# Patient Record
Sex: FEMALE | Race: ASIAN | NOT HISPANIC OR LATINO | Employment: PART TIME | URBAN - METROPOLITAN AREA
[De-identification: names, ages, dates, MRNs, and addresses within clinical notes are randomized per-mention and may not be internally consistent; named-entity substitution may affect disease eponyms.]

---

## 2017-09-08 ENCOUNTER — OFFICE VISIT (OUTPATIENT)
Dept: FAMILY MEDICINE | Facility: CLINIC | Age: 47
End: 2017-09-08
Payer: COMMERCIAL

## 2017-09-08 VITALS
DIASTOLIC BLOOD PRESSURE: 61 MMHG | SYSTOLIC BLOOD PRESSURE: 99 MMHG | WEIGHT: 124 LBS | HEIGHT: 57 IN | BODY MASS INDEX: 26.75 KG/M2 | TEMPERATURE: 97.3 F | OXYGEN SATURATION: 98 % | HEART RATE: 71 BPM

## 2017-09-08 DIAGNOSIS — R79.89 LOW TSH LEVEL: ICD-10-CM

## 2017-09-08 DIAGNOSIS — R79.89 ELEVATED LFTS: ICD-10-CM

## 2017-09-08 DIAGNOSIS — D72.829 LEUKOCYTOSIS, UNSPECIFIED TYPE: Primary | ICD-10-CM

## 2017-09-08 DIAGNOSIS — Z23 ENCOUNTER FOR IMMUNIZATION: ICD-10-CM

## 2017-09-08 LAB
ALBUMIN SERPL-MCNC: 3.4 G/DL (ref 3.4–5)
ALP SERPL-CCNC: 67 U/L (ref 40–150)
ALT SERPL W P-5'-P-CCNC: 31 U/L (ref 0–50)
ANION GAP SERPL CALCULATED.3IONS-SCNC: 8 MMOL/L (ref 3–14)
AST SERPL W P-5'-P-CCNC: 17 U/L (ref 0–45)
BASOPHILS # BLD AUTO: 0.1 10E9/L (ref 0–0.2)
BASOPHILS NFR BLD AUTO: 0.6 %
BILIRUB SERPL-MCNC: 0.3 MG/DL (ref 0.2–1.3)
BUN SERPL-MCNC: 13 MG/DL (ref 7–30)
CALCIUM SERPL-MCNC: 8.5 MG/DL (ref 8.5–10.1)
CHLORIDE SERPL-SCNC: 107 MMOL/L (ref 94–109)
CO2 SERPL-SCNC: 26 MMOL/L (ref 20–32)
CREAT SERPL-MCNC: 0.9 MG/DL (ref 0.52–1.04)
DIFFERENTIAL METHOD BLD: NORMAL
EOSINOPHIL # BLD AUTO: 0.1 10E9/L (ref 0–0.7)
EOSINOPHIL NFR BLD AUTO: 1.6 %
ERYTHROCYTE [DISTWIDTH] IN BLOOD BY AUTOMATED COUNT: 12.3 % (ref 10–15)
GFR SERPL CREATININE-BSD FRML MDRD: 67 ML/MIN/1.7M2
GLUCOSE SERPL-MCNC: 97 MG/DL (ref 70–99)
HCT VFR BLD AUTO: 36.5 % (ref 35–47)
HGB BLD-MCNC: 12.1 G/DL (ref 11.7–15.7)
LYMPHOCYTES # BLD AUTO: 2.7 10E9/L (ref 0.8–5.3)
LYMPHOCYTES NFR BLD AUTO: 33.6 %
MCH RBC QN AUTO: 31.3 PG (ref 26.5–33)
MCHC RBC AUTO-ENTMCNC: 33.2 G/DL (ref 31.5–36.5)
MCV RBC AUTO: 94 FL (ref 78–100)
MONOCYTES # BLD AUTO: 0.7 10E9/L (ref 0–1.3)
MONOCYTES NFR BLD AUTO: 8.8 %
NEUTROPHILS # BLD AUTO: 4.5 10E9/L (ref 1.6–8.3)
NEUTROPHILS NFR BLD AUTO: 55.4 %
PLATELET # BLD AUTO: 205 10E9/L (ref 150–450)
POTASSIUM SERPL-SCNC: 3.6 MMOL/L (ref 3.4–5.3)
PROT SERPL-MCNC: 6.9 G/DL (ref 6.8–8.8)
RBC # BLD AUTO: 3.87 10E12/L (ref 3.8–5.2)
SODIUM SERPL-SCNC: 141 MMOL/L (ref 133–144)
TSH SERPL DL<=0.005 MIU/L-ACNC: 0.48 MU/L (ref 0.4–4)
WBC # BLD AUTO: 8.1 10E9/L (ref 4–11)

## 2017-09-08 PROCEDURE — 87340 HEPATITIS B SURFACE AG IA: CPT | Performed by: FAMILY MEDICINE

## 2017-09-08 PROCEDURE — 80050 GENERAL HEALTH PANEL: CPT | Performed by: FAMILY MEDICINE

## 2017-09-08 PROCEDURE — 86803 HEPATITIS C AB TEST: CPT | Performed by: FAMILY MEDICINE

## 2017-09-08 PROCEDURE — 36415 COLL VENOUS BLD VENIPUNCTURE: CPT | Performed by: FAMILY MEDICINE

## 2017-09-08 PROCEDURE — 86709 HEPATITIS A IGM ANTIBODY: CPT | Performed by: FAMILY MEDICINE

## 2017-09-08 PROCEDURE — 90471 IMMUNIZATION ADMIN: CPT | Performed by: FAMILY MEDICINE

## 2017-09-08 PROCEDURE — 90472 IMMUNIZATION ADMIN EACH ADD: CPT | Performed by: FAMILY MEDICINE

## 2017-09-08 PROCEDURE — 90686 IIV4 VACC NO PRSV 0.5 ML IM: CPT | Performed by: FAMILY MEDICINE

## 2017-09-08 PROCEDURE — 90715 TDAP VACCINE 7 YRS/> IM: CPT | Performed by: FAMILY MEDICINE

## 2017-09-08 PROCEDURE — 99214 OFFICE O/P EST MOD 30 MIN: CPT | Mod: 25 | Performed by: FAMILY MEDICINE

## 2017-09-08 ASSESSMENT — PAIN SCALES - GENERAL: PAINLEVEL: NO PAIN (0)

## 2017-09-08 NOTE — LETTER
September 12, 2017      Abbe Hines  [address] [facility] [place] [address]        Dear ,    We are writing to inform you of your test results.    Your kidney, liver, electrolyte, blood count and thyroid tests were normal for you.     Resulted Orders   Comprehensive metabolic panel (BMP + Alb, Alk Phos, ALT, AST, Total. Bili, TP)   Result Value Ref Range    Sodium 141 133 - 144 mmol/L    Potassium 3.6 3.4 - 5.3 mmol/L    Chloride 107 94 - 109 mmol/L    Carbon Dioxide 26 20 - 32 mmol/L    Anion Gap 8 3 - 14 mmol/L    Glucose 97 70 - 99 mg/dL    Urea Nitrogen 13 7 - 30 mg/dL    Creatinine 0.90 0.52 - 1.04 mg/dL    GFR Estimate 67 >60 mL/min/1.7m2      Comment:      Non  GFR Calc    GFR Estimate If Black 81 >60 mL/min/1.7m2      Comment:       GFR Calc    Calcium 8.5 8.5 - 10.1 mg/dL    Bilirubin Total 0.3 0.2 - 1.3 mg/dL    Albumin 3.4 3.4 - 5.0 g/dL    Protein Total 6.9 6.8 - 8.8 g/dL    Alkaline Phosphatase 67 40 - 150 U/L    ALT 31 0 - 50 U/L    AST 17 0 - 45 U/L   CBC with platelets differential   Result Value Ref Range    WBC 8.1 4.0 - 11.0 10e9/L    RBC Count 3.87 3.8 - 5.2 10e12/L    Hemoglobin 12.1 11.7 - 15.7 g/dL    Hematocrit 36.5 35.0 - 47.0 %    MCV 94 78 - 100 fl    MCH 31.3 26.5 - 33.0 pg    MCHC 33.2 31.5 - 36.5 g/dL    RDW 12.3 10.0 - 15.0 %    Platelet Count 205 150 - 450 10e9/L    Diff Method Automated Method     % Neutrophils 55.4 %    % Lymphocytes 33.6 %    % Monocytes 8.8 %    % Eosinophils 1.6 %    % Basophils 0.6 %    Absolute Neutrophil 4.5 1.6 - 8.3 10e9/L    Absolute Lymphocytes 2.7 0.8 - 5.3 10e9/L    Absolute Monocytes 0.7 0.0 - 1.3 10e9/L    Absolute Eosinophils 0.1 0.0 - 0.7 10e9/L    Absolute Basophils 0.1 0.0 - 0.2 10e9/L   Hepatitis A antibody IgM   Result Value Ref Range    Hepatitis A IgM Betzaida Nonreactive NR^Nonreactive      Comment:      IgM anti-HAV not detected. Does not exclude the possibility of recent exposure   to or infection with  HAV.     Hepatitis B surface antigen   Result Value Ref Range    Hep B Surface Agn Nonreactive NR^Nonreactive   Hepatitis C antibody   Result Value Ref Range    Hepatitis C Antibody Nonreactive NR^Nonreactive      Comment:      Assay performance characteristics have not been established for newborns,   infants, and children     TSH with free T4 reflex   Result Value Ref Range    TSH 0.48 0.40 - 4.00 mU/L       If you have any questions or concerns, please call the clinic at the number listed above.       Sincerely,        Eder Kelsey MD

## 2017-09-08 NOTE — PROGRESS NOTES
"  SUBJECTIVE:   Abbe Hines is a 47 year old female who presents to clinic today for the following health issues:      New Patient/Transfer of Care  Patient would like to discuss checking blood count. Was in hospital in July and was told blood count was elevated and to establish care with primary to follow thru.    Patient asymptomatic. No fever, chills, no sob, cp. No rashes.    Problem list and histories reviewed & adjusted, as indicated.  Additional history: as documented    Patient Active Problem List   Diagnosis     CARDIOVASCULAR SCREENING; LDL GOAL LESS THAN 160     Past Surgical History:   Procedure Laterality Date     CHOLECYSTECTOMY  July 2012       Social History   Substance Use Topics     Smoking status: Never Smoker     Smokeless tobacco: Never Used     Alcohol use No     Family History   Problem Relation Age of Onset     CANCER No family hx of      DIABETES No family hx of      Hypertension No family hx of      CEREBROVASCULAR DISEASE No family hx of      Thyroid Disease No family hx of      Glaucoma No family hx of      Macular Degeneration No family hx of              Reviewed and updated as needed this visit by clinical staffTobacco       Reviewed and updated as needed this visit by Provider         ROS:  Constitutional, HEENT, cardiovascular, pulmonary, GI, , musculoskeletal, neuro, skin, endocrine and psych systems are negative, except as otherwise noted.      OBJECTIVE:   BP 99/61 (BP Location: Left arm, Patient Position: Chair, Cuff Size: Adult Regular)  Pulse 71  Temp 97.3  F (36.3  C) (Oral)  Ht 4' 9.25\" (1.454 m)  Wt 124 lb (56.2 kg)  SpO2 98%  BMI 26.6 kg/m2  Body mass index is 26.6 kg/(m^2).  GENERAL: healthy, alert and no distress  NECK: no adenopathy, no asymmetry, masses, or scars and thyroid normal to palpation  RESP: lungs clear to auscultation - no rales, rhonchi or wheezes  CV: regular rate and rhythm, normal S1 S2, no S3 or S4, no murmur, click or rub, no peripheral edema and " peripheral pulses strong  ABDOMEN: soft, nontender, no hepatosplenomegaly, no masses and bowel sounds normal  MS: no gross musculoskeletal defects noted, no edema    Diagnostic Test Results:  none     ASSESSMENT/PLAN:       1. Leukocytosis, unspecified type  Likely stress-induced to acute illness. Recheck today.  - CBC with platelets differential    2. Elevated LFTs  Recent CT scan showed no liver abnormalities. Recheck today with viral hepatitis studies.  - Comprehensive metabolic panel (BMP + Alb, Alk Phos, ALT, AST, Total. Bili, TP)  - Hepatitis A antibody IgM  - Hepatitis B surface antigen  - Hepatitis C antibody    3. Low TSH level  History of. Recheck.  - TSH with free T4 reflex    4. Encounter for immunization    - HC FLU VAC PRESRV FREE QUAD SPLIT VIR 3+YRS IM  - ADMIN 1st VACCINE    See Patient Instructions    Eder Kelsey MD, MD  [facility]

## 2017-09-08 NOTE — MR AVS SNAPSHOT
After Visit Summary   9/8/2017    Abbe Hines    MRN: [id number]           Patient Information     Date Of Birth          1970        Visit Information        Provider Department      9/8/2017 2:15 PM Eder Kelsey MD; [employer] [facility] [facility]        Today's Diagnoses     Leukocytosis, unspecified type    -  1    Elevated LFTs        Encounter for immunization          Care Instructions    How to contact your care team providers:    [facility]  [phone]  Pharmacy [phone]    CHANO Gold Dr., Dr., PA-C, Dr., PA-C    Team RN: Fredo DUNN and Azeb BOLTON    Clinic hours  M-Th 7am-7pm   Fri 7am-5pm.   Urgent care M-F 11am-9pm,   Sat/sun 9am-5pm.  Pharmacy M-F 8:00am-8pm Sat/sun 9am-5pm.     All password changes, disabled accounts, or ID changes in [employer]/MyHealth will be done by our Access Services Department.   If you need help with your account or password, call: [phone]. Clinic staff no longer has the ability to change passwords.                         Follow-ups after your visit        Who to contact     If you have questions or need follow up information about today's clinic visit or your schedule please contact [facility] directly at [phone].  Normal or non-critical lab and imaging results will be communicated to you by MyChart, letter or phone within 4 business days after the clinic has received the results. If you do not hear from us within 7 days, please contact the clinic through [employer] or phone. If you have a critical or abnormal lab result, we will notify you by phone as soon as possible.  Submit refill requests through [employer] or call your pharmacy and they will forward the refill request to us. Please allow 3 business days for your refill to be completed.     "      Additional Information About Your Visit        MyChart Information     [employer] lets you send messages to your doctor, view your test results, renew your prescriptions, schedule appointments and more. To sign up, go to [email or web address] . Click on \"Log in\" on the left side of the screen, which will take you to the Welcome page. Then click on \"Sign up Now\" on the right side of the page.     You will be asked to enter the access code listed below, as well as some personal information. Please follow the directions to create your username and password.     Your access code is: [email or web address]  Expires: 2017  2:42 PM     Your access code will  in 90 days. If you need help or a new code, please call your [facility] clinic or [phone].        Care EveryWhere ID     This is your Care EveryWhere ID. This could be used by other organizations to access your [facility] medical records  [id number]        Your Vitals Were     Pulse Temperature Height Pulse Oximetry BMI (Body Mass Index)       71 97.3  F (36.3  C) (Oral) 4' 9.25\" (1.454 m) 98% 26.6 kg/m2        Blood Pressure from Last 3 Encounters:   17 99/61   10/20/15 108/67   10/06/15 131/71    Weight from Last 3 Encounters:   17 124 lb (56.2 kg)   10/20/15 122 lb 8 oz (55.6 kg)   10/06/15 123 lb (55.8 kg)              We Performed the Following     ADMIN 1st VACCINE     CBC with platelets differential     Comprehensive metabolic panel (BMP + Alb, Alk Phos, ALT, AST, Total. Bili, TP)     HC FLU VAC PRESRV FREE QUAD SPLIT VIR 3+YRS IM     Hepatitis A antibody IgM     Hepatitis B surface antigen     Hepatitis C antibody        Primary Care Provider    Clinic Unassigned Karel Norman MD       No address on file        Equal Access to Services     [facility] : Hank Hickman, wamaryda lupankajadaha, qaybta kaalmada tono, david rascon. So [facility] [phone].    ATENCIÓN: Si habla español, tiene a tong disposición " servicios gratuitos de asistencia lingüística. Cortes xie [phone].    We comply with applicable federal civil rights laws and [place] laws. We do not discriminate on the basis of race, color, national origin, age, disability sex, sexual orientation or gender identity.            Thank you!     Thank you for choosing [facility]  for your care. Our goal is always to provide you with excellent care. Hearing back from our patients is one way we can continue to improve our services. Please take a few minutes to complete the written survey that you may receive in the mail after your visit with us. Thank you!             Your Updated Medication List - Protect others around you: Learn how to safely use, store and throw away your medicines at www.disposemymeds.org.          This list is accurate as of: 9/8/17  2:42 PM.  Always use your most recent med list.                   Brand Name Dispense Instructions for use Diagnosis    diclofenac 75 MG EC tablet    VOLTAREN    60 tablet    Take 1 tablet (75 mg) by mouth 2 times daily as needed for moderate pain Take with food.    Neck pain, Muscle spasm

## 2017-09-08 NOTE — NURSING NOTE
"Chief Complaint   Patient presents with     Establish Care     was told  had elevated blood count at hospital in july, was told to establish care with primary to follow thru with blood work       Initial BP 99/61 (BP Location: Left arm, Patient Position: Chair, Cuff Size: Adult Regular)  Pulse 71  Temp 97.3  F (36.3  C) (Oral)  Ht 4' 9.25\" (1.454 m)  Wt 124 lb (56.2 kg)  SpO2 98%  BMI 26.6 kg/m2 Estimated body mass index is 26.6 kg/(m^2) as calculated from the following:    Height as of this encounter: 4' 9.25\" (1.454 m).    Weight as of this encounter: 124 lb (56.2 kg).  Medication Reconciliation: complete     Heather La MA      "

## 2017-09-08 NOTE — NURSING NOTE
Injectable Influenza Immunization Documentation      1.  Has the patient received the information for the injectable influenza vaccine? YES    2. Is the patient 6 months of age or older? YES    3. Does the patient have any of the following contraindications?          Severe allergy to eggs? No     Severe allergic reaction to previous influenza vaccines? No     Allergy to contact lens solution/thimerosol? No     History of Guillain-Muir syndrome? No     Undergoing chemotherapy or radiation therapy?       (vaccine should be given at least 2 weeks prior or 3 weeks after)  No     Currently have moderate or severe illness? No         4.  The vaccine has been administered and the patient was instructed to wait 15 minutes before leaving the building in the event of an allergic reaction: YES    Vaccination given by Heather La MA    Screening Questionnaire for Adult Immunization    Are you sick today?   No   Do you have allergies to medications, food, a vaccine component or latex?   No   Have you ever had a serious reaction after receiving a vaccination?   No   Do you have a long-term health problem with heart disease, lung disease, asthma, kidney disease, metabolic disease (e.g. diabetes), anemia, or other blood disorder?   No   Do you have cancer, leukemia, HIV/AIDS, or any other immune system problem?   No   In the past 3 months, have you taken medications that affect  your immune system, such as prednisone, other steroids, or anticancer drugs; drugs for the treatment of rheumatoid arthritis, Crohn s disease, or psoriasis; or have you had radiation treatments?   No   Have you had a seizure, or a brain or other nervous system problem?   No   During the past year, have you received a transfusion of blood or blood     products, or been given immune (gamma) globulin or antiviral drug?   No   For women: Are you pregnant or is there a chance you could become        pregnant during the next month?   No   Have you received any  vaccinations in the past 4 weeks?   No     Immunization questionnaire answers were all negative.        Per orders of Dr. Kelsey, injection of tdap given by Heathre La. Patient instructed to remain in clinic for 15 minutes afterwards, and to report any adverse reaction to me immediately.       Screening performed by Heather La on 9/8/2017 at 2:59 PM.

## 2017-09-08 NOTE — PATIENT INSTRUCTIONS
How to contact your care team providers:    [facility]  [phone]  Pharmacy [phone]    CHANO Gold Dr., Dr., PA-C, Dr., PA-C    Team RN: Fredo BOLTON    Clinic hours  M-Th 7am-7pm   Fri 7am-5pm.   Urgent care M-F 11am-9pm,   Sat/sun 9am-5pm.  Pharmacy M-F 8:00am-8pm Sat/sun 9am-5pm.     All password changes, disabled accounts, or ID changes in [employer]/MyHealth will be done by our Access Services Department.   If you need help with your account or password, call: [phone]. Clinic staff no longer has the ability to change passwords.

## 2017-09-11 LAB
HAV IGM SERPL QL IA: NONREACTIVE
HBV SURFACE AG SERPL QL IA: NONREACTIVE
HCV AB SERPL QL IA: NONREACTIVE

## 2017-09-12 ENCOUNTER — ALLIED HEALTH/NURSE VISIT (OUTPATIENT)
Dept: NURSING | Facility: CLINIC | Age: 47
End: 2017-09-12
Payer: COMMERCIAL

## 2017-09-12 DIAGNOSIS — Z11.1 SCREENING EXAMINATION FOR PULMONARY TUBERCULOSIS: Primary | ICD-10-CM

## 2017-09-12 PROCEDURE — 86580 TB INTRADERMAL TEST: CPT

## 2017-09-12 PROCEDURE — 99207 ZZC NO CHARGE NURSE ONLY: CPT

## 2017-09-12 NOTE — MR AVS SNAPSHOT
"              After Visit Summary   9/12/2017    Abbe Hines    MRN: [id number]           Patient Information     Date Of Birth          1970        Visit Information        Provider Department      9/12/2017 3:30 PM [employer] [employer]; [facility]        Today's Diagnoses     Screening examination for pulmonary tuberculosis    -  1       Follow-ups after your visit        Your next 10 appointments already scheduled     Sep 14, 2017  3:15 PM CDT   Nurse Only with DONTRELL RN   [facility]    [address] [place] [id number] [phone]              Who to contact     If you have questions or need follow up information about today's clinic visit or your schedule please contact [facility] directly at [phone].  Normal or non-critical lab and imaging results will be communicated to you by [employer], letter or phone within 4 business days after the clinic has received the results. If you do not hear from us within 7 days, please contact the clinic through MyChart or phone. If you have a critical or abnormal lab result, we will notify you by phone as soon as possible.  Submit refill requests through [employer] or call your pharmacy and they will forward the refill request to us. Please allow 3 business days for your refill to be completed.          Additional Information About Your Visit        MyChart Information     [employer] lets you send messages to your doctor, view your test results, renew your prescriptions, schedule appointments and more. To sign up, go to [email or web address] . Click on \"Log in\" on the left side of the screen, which will take you to the Welcome page. Then click on \"Sign up Now\" on the right side of the page.     You will be asked to enter the access code listed below, as well as some personal information. Please follow the directions to create your username and " password.     Your access code is: [email or web address]  Expires: 2017  2:42 PM     Your access code will  in 90 days. If you need help or a new code, please call your [place] clinic or [phone].        Care EveryWhere ID     This is your Care EveryWhere ID. This could be used by other organizations to access your [place] medical records  [id number]         Blood Pressure from Last 3 Encounters:   17 99/61   10/20/15 108/67   10/06/15 131/71    Weight from Last 3 Encounters:   17 124 lb (56.2 kg)   10/20/15 122 lb 8 oz (55.6 kg)   10/06/15 123 lb (55.8 kg)              We Performed the Following     ADMIN 1st VACCINE     TB INTRADERMAL TEST        Primary Care Provider    Clinic Unassigned Karel Norman MD       No address on file        Equal Access to Services     [facility]: Hadii acosta ku hadasho Soomaali, waaxda luqadaha, qaybta kaalmada adeegyada, waxay antelmoin norman salinas . So [facility] [phone].    ATENCIÓN: Si habla español, tiene a tong disposición servicios gratuitos de asistencia lingüística. Llame al [phone].    We comply with applicable federal civil rights laws and [place] laws. We do not discriminate on the basis of race, color, national origin, age, disability sex, sexual orientation or gender identity.            Thank you!     Thank you for choosing [facility]  for your care. Our goal is always to provide you with excellent care. Hearing back from our patients is one way we can continue to improve our services. Please take a few minutes to complete the written survey that you may receive in the mail after your visit with us. Thank you!             Your Updated Medication List - Protect others around you: Learn how to safely use, store and throw away your medicines at www.disposemymeds.org.          This list is accurate as of: 17  3:39 PM.  Always use your most recent med list.                   Brand Name Dispense Instructions for use  Diagnosis    diclofenac 75 MG EC tablet    VOLTAREN    60 tablet    Take 1 tablet (75 mg) by mouth 2 times daily as needed for moderate pain Take with food.    Neck pain, Muscle spasm

## 2017-09-12 NOTE — NURSING NOTE
The patient is asked the following questions today and these are her answers:    -Have you had a mantoux administered in the past 30 days?    No  -Have you had a previous positive Mantoux.  No  -Have you received BCG in the past.  No  -Have you had a live vaccine  (MMR, Varicella, OPV, Yellow Fever) in the last 6 weeks.  No  -Have you had and active  viral or bacterial infection in the past 6 weeks.  No  -Have you received corticosteroids or immunosuppressive agents in the past 6 weeks.  No  -Have you been diagnosed with HIV?  No  -Do you have a maglinancy?  No   IDALMIS Juares MA

## 2017-09-14 ENCOUNTER — ALLIED HEALTH/NURSE VISIT (OUTPATIENT)
Dept: NURSING | Facility: CLINIC | Age: 47
End: 2017-09-14

## 2017-09-14 DIAGNOSIS — Z11.1 SCREENING EXAMINATION FOR PULMONARY TUBERCULOSIS: Primary | ICD-10-CM

## 2017-09-14 LAB
PPDINDURATION: 0 MM (ref 0–5)
PPDREDNESS: 0 MM

## 2017-09-14 NOTE — PROGRESS NOTES
Mantoux results NEGATIVE.   No induration.  No swelling.  No redness.    Copy of results given to patient.     Azeb Turner RN

## 2017-09-14 NOTE — MR AVS SNAPSHOT
"              After Visit Summary   2017    Abbe Hines    MRN: [id number]           Patient Information     Date Of Birth          1970        Visit Information        Provider Department      2017 3:15 PM [employer]; [facility]        Today's Diagnoses     Screening examination for pulmonary tuberculosis    -  1       Follow-ups after your visit        Who to contact     If you have questions or need follow up information about today's clinic visit or your schedule please contact [facility] directly at [phone].  Normal or non-critical lab and imaging results will be communicated to you by [employer], letter or phone within 4 business days after the clinic has received the results. If you do not hear from us within 7 days, please contact the clinic through [employer] or phone. If you have a critical or abnormal lab result, we will notify you by phone as soon as possible.  Submit refill requests through [employer] or call your pharmacy and they will forward the refill request to us. Please allow 3 business days for your refill to be completed.          Additional Information About Your Visit        MyChart Information     [employer] lets you send messages to your doctor, view your test results, renew your prescriptions, schedule appointments and more. To sign up, go to [email or web address] . Click on \"Log in\" on the left side of the screen, which will take you to the Welcome page. Then click on \"Sign up Now\" on the right side of the page.     You will be asked to enter the access code listed below, as well as some personal information. Please follow the directions to create your username and password.     Your access code is: [email or web address]  Expires: 2017  2:42 PM     Your access code will  in 90 days. If you need help or a new code, please call your [facility] or [phone].        Care EveryWhere ID     This is your Care " EveryWhere ID. This could be used by other organizations to access your [facility] medical records  [id number]         Blood Pressure from Last 3 Encounters:   09/08/17 99/61   10/20/15 108/67   10/06/15 131/71    Weight from Last 3 Encounters:   09/08/17 124 lb (56.2 kg)   10/20/15 122 lb 8 oz (55.6 kg)   10/06/15 123 lb (55.8 kg)              Today, you had the following     No orders found for display       Primary Care Provider    Clinic Unassigned Karel Norman MD       No address on file        Equal Access to Services     [facility]: Hadii aad ku hadasho Soomaali, waaxda luqadaha, qaybta kaalmada adeegyalita, david salinas . So [facility] [phone].    ATENCIÓN: Si habla español, tiene a tong disposición servicios gratuitos de asistencia lingüística. Llame al [phone].    We comply with applicable federal civil rights laws and [place] laws. We do not discriminate on the basis of race, color, national origin, age, disability sex, sexual orientation or gender identity.            Thank you!     Thank you for choosing [facility]  for your care. Our goal is always to provide you with excellent care. Hearing back from our patients is one way we can continue to improve our services. Please take a few minutes to complete the written survey that you may receive in the mail after your visit with us. Thank you!             Your Updated Medication List - Protect others around you: Learn how to safely use, store and throw away your medicines at www.disposemymeds.org.          This list is accurate as of: 9/14/17  3:47 PM.  Always use your most recent med list.                   Brand Name Dispense Instructions for use Diagnosis    diclofenac 75 MG EC tablet    VOLTAREN    60 tablet    Take 1 tablet (75 mg) by mouth 2 times daily as needed for moderate pain Take with food.    Neck pain, Muscle spasm

## 2017-10-18 ENCOUNTER — OFFICE VISIT (OUTPATIENT)
Dept: FAMILY MEDICINE | Facility: CLINIC | Age: 47
End: 2017-10-18
Payer: COMMERCIAL

## 2017-10-18 VITALS
SYSTOLIC BLOOD PRESSURE: 122 MMHG | TEMPERATURE: 97.3 F | WEIGHT: 120 LBS | DIASTOLIC BLOOD PRESSURE: 84 MMHG | HEART RATE: 68 BPM | BODY MASS INDEX: 25.89 KG/M2 | HEIGHT: 57 IN

## 2017-10-18 DIAGNOSIS — M54.2 CERVICALGIA: Primary | ICD-10-CM

## 2017-10-18 PROCEDURE — 99214 OFFICE O/P EST MOD 30 MIN: CPT | Performed by: FAMILY MEDICINE

## 2017-10-18 RX ORDER — CYCLOBENZAPRINE HCL 10 MG
10 TABLET ORAL 3 TIMES DAILY PRN
Qty: 90 TABLET | Refills: 1 | Status: SHIPPED | OUTPATIENT
Start: 2017-10-18

## 2017-10-18 RX ORDER — NAPROXEN SODIUM 550 MG/1
550 TABLET ORAL 2 TIMES DAILY WITH MEALS
Qty: 60 TABLET | Refills: 3 | Status: SHIPPED | OUTPATIENT
Start: 2017-10-18

## 2017-10-18 NOTE — PATIENT INSTRUCTIONS
How to contact your care team providers:    [facility]  [phone]  Pharmacy [phone]    CHANO Gold Dr., Dr., PA-C, Dr., PA-C    Team RN: Fredo BOLTON    Clinic hours  M-Th 7am-7pm   Fri 7am-5pm.   Urgent care M-F 11am-9pm,   Sat/sun 9am-5pm.  Pharmacy M-F 8:00am-8pm Sat/sun 9am-5pm.     All password changes, disabled accounts, or ID changes in [employer]/MyHealth will be done by our Access Services Department.   If you need help with your account or password, call: [phone]. Clinic staff no longer has the ability to change passwords.

## 2017-10-18 NOTE — NURSING NOTE
"Chief Complaint   Patient presents with     Headache     rear ended last night, woke up with some headache       Initial /84 (BP Location: Left arm, Patient Position: Chair, Cuff Size: Adult Regular)  Pulse 68  Temp 97.3  F (36.3  C) (Oral)  Ht 4' 9.25\" (1.454 m)  Wt 120 lb (54.4 kg)  BMI 25.74 kg/m2 Estimated body mass index is 25.74 kg/(m^2) as calculated from the following:    Height as of this encounter: 4' 9.25\" (1.454 m).    Weight as of this encounter: 120 lb (54.4 kg).  Medication Reconciliation: complete     Heather La MA      "
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
## 2017-10-18 NOTE — MR AVS SNAPSHOT
After Visit Summary   10/18/2017    Abbe Hines    MRN: [id number]           Patient Information     Date Of Birth          1970        Visit Information        Provider Department      10/18/2017 2:00 PM Eder Kelsey MD; [employer] [facility]        Today's Diagnoses     Cervicalgia    -  1      Care Instructions    How to contact your care team providers:    [facility]  [phone]  Pharmacy [phone]    [address] Dr., PA-C, Dr., PA-C    Team RN: Fredo DUNN and Azeb BOLTON    Clinic hours  M-Th 7am-7pm   Fri 7am-5pm.   Urgent care M-F 11am-9pm,   Sat/sun 9am-5pm.  Pharmacy M-F 8:00am-8pm Sat/sun 9am-5pm.     All password changes, disabled accounts, or ID changes in [employer]MyHealth will be done by our Access Services Department.   If you need help with your account or password, call: [phone]. Clinic staff no longer has the ability to change passwords.                         Follow-ups after your visit        Who to contact     If you have questions or need follow up information about today's clinic visit or your schedule please contact [facility] directly at [phone].  Normal or non-critical lab and imaging results will be communicated to you by MyChart, letter or phone within 4 business days after the clinic has received the results. If you do not hear from us within 7 days, please contact the clinic through [employer] or phone. If you have a critical or abnormal lab result, we will notify you by phone as soon as possible.  Submit refill requests through [employer] or call your pharmacy and they will forward the refill request to us. Please allow 3 business days for your refill to be completed.          Additional Information About Your Visit        [employer]  "Information     [employer] lets you send messages to your doctor, view your test results, renew your prescriptions, schedule appointments and more. To sign up, go to [email or web address] . Click on \"Log in\" on the left side of the screen, which will take you to the Welcome page. Then click on \"Sign up Now\" on the right side of the page.     You will be asked to enter the access code listed below, as well as some personal information. Please follow the directions to create your username and password.     Your access code is: [email or web address]  Expires: 2017  2:42 PM     Your access code will  in 90 days. If you need help or a new code, please call your [place] clinic or [phone].        Care EveryWhere ID     This is your Care EveryWhere ID. This could be used by other organizations to access your [place] medical records  [id number]        Your Vitals Were     Pulse Temperature Height BMI (Body Mass Index)          68 97.3  F (36.3  C) (Oral) 4' 9.25\" (1.454 m) 25.74 kg/m2         Blood Pressure from Last 3 Encounters:   10/18/17 122/84   17 99/61   10/20/15 108/67    Weight from Last 3 Encounters:   10/18/17 120 lb (54.4 kg)   17 124 lb (56.2 kg)   10/20/15 122 lb 8 oz (55.6 kg)              Today, you had the following     No orders found for display         Today's Medication Changes          These changes are accurate as of: 10/18/17  2:15 PM.  If you have any questions, ask your nurse or doctor.               Start taking these medicines.        Dose/Directions    cyclobenzaprine 10 MG tablet   Commonly known as:  FLEXERIL   Used for:  Cervicalgia   Started by:  Eder Kelsey MD        Dose:  10 mg   Take 1 tablet (10 mg) by mouth 3 times daily as needed for muscle spasms   Quantity:  90 tablet   Refills:  1       naproxen sodium 550 MG tablet   Commonly known as:  ANAPROX   Used for:  Cervicalgia   Started by:  Eder Kelsey MD        Dose:  550 mg   Take 1 tablet (550 mg) by mouth 2 times " daily (with meals)   Quantity:  60 tablet   Refills:  3            Where to get your medicines      [place] [facility] [place] [address]  [address], [facility] [address]     Phone:  [phone]     cyclobenzaprine 10 MG tablet    naproxen sodium 550 MG tablet                Primary Care Provider Office Phone # Fax #    Eder Kelsey [phone]       [address]  [facility] [address]        Equal Access to Services     [facility]: Hadii aad ku hadasho Soomaali, waaxda luqadaha, qaybta kaalmada adeegyada, waxay idiin hayaan adeagustin salinas . So [facility] [phone].    ATENCIÓN: Si habla español, tiene a tong disposición servicios gratuitos de asistencia lingüística. [facility] [phone].    We comply with applicable federal civil rights laws and [place] laws. We do not discriminate on the basis of race, color, national origin, age, disability, sex, sexual orientation, or gender identity.            Thank you!     Thank you for choosing [facility]  for your care. Our goal is always to provide you with excellent care. Hearing back from our patients is one way we can continue to improve our services. Please take a few minutes to complete the written survey that you may receive in the mail after your visit with us. Thank you!             Your Updated Medication List - Protect others around you: Learn how to safely use, store and throw away your medicines at www.disposemymeds.org.          This list is accurate as of: 10/18/17  2:15 PM.  Always use your most recent med list.                   Brand Name Dispense Instructions for use Diagnosis    cyclobenzaprine 10 MG tablet    FLEXERIL    90 tablet    Take 1 tablet (10 mg) by mouth 3 times daily as needed for muscle spasms    Cervicalgia       naproxen sodium 550 MG tablet    ANAPROX    60 tablet    Take 1 tablet (550 mg) by mouth 2 times daily (with  meals)    Cervicalgia

## 2017-10-22 ENCOUNTER — HEALTH MAINTENANCE LETTER (OUTPATIENT)
Age: 47
End: 2017-10-22

## 2017-11-06 ENCOUNTER — TELEPHONE (OUTPATIENT)
Dept: FAMILY MEDICINE | Facility: CLINIC | Age: 47
End: 2017-11-06

## 2017-11-06 NOTE — LETTER
November 6, 2017      Abbe Hines  [address] [facility] [place] [address]          Dear Abbe Hines, [id number]    At [facility] we care about your health and are committed to providing quality patient care, which includes staying current on preventative cancer screenings.  You can increase your chances of finding and treating cancers through regular screenings.      Our records show that you are due for the following screening(s): Physical with pap  Pap Smear for cervical cancer  Recommended every three years for women 21 and older  A Pap test is used to detect cervical cancer.  The test should be taken at least once every three years but women who are at a greater risk for cervical cancer may need to have the test more often.      You are at a greater risk for cervical cancer if:   - You have had a sexually transmitted disease   - You have had more than one sex partner   - You have had an abnormal pap test in the past    You may contact the [facility] at [phone] to schedule the screening test(s) at your earliest convenience.    If you have a My-Chart Account, you also can schedule this appointment through there.    If you have already had one or all of the above screening tests at another facility, please call us so that we may update your chart.      Your partners in health,      Quality Committee   [facility]

## 2017-11-07 NOTE — TELEPHONE ENCOUNTER
Panel Management Review        Last Office Visit with this department: 10/18/2017    Fail List measure: Physical with pap.      Patient is due/failing the following:   PAP and PHYSICAL    Action needed:   Patient needs office visit for Physical with pap..    Type of outreach:    Phone, left message for patient to call back.  and Sent letter.    Questions for provider review:    None                                                                                                                                    Allison Huerta CMA

## 2018-03-30 ENCOUNTER — TELEPHONE (OUTPATIENT)
Dept: FAMILY MEDICINE | Facility: CLINIC | Age: 48
End: 2018-03-30

## 2018-03-30 NOTE — LETTER
March 30, 2018    Abbe Hines  [address] [place] [address]      Dear Abbe Hines,     In order to ensure we are providing the best quality care, we have reviewed your chart and see that you are due for:    -Physical with pap smear: Pap smear is a screening test used to detect cervical cancer. It is recommended every three years for women 21 and older. The test should be done at least once every three years but women who are at greater risk for cervical cancer may need to have the test more often.    Please call the clinic at your earliest convenience to schedule an appointment. If you have had any of the screenings listed above at another facility, please call us so that we may update your chart.      Thank you for trusting us with your health care.    Sincerely,    [facility]/[phone]  [phone]

## 2018-03-30 NOTE — TELEPHONE ENCOUNTER
Panel Management Review      BP Readings from Last 1 Encounters:   10/18/17 122/84      Last Office Visit with this department: 11/6/2017    Fail List measure: PAP      Patient is due/failing the following:   PAP    Action needed:   Patient needs office visit for PHYSICAL.    Type of outreach:    Sent letter.    Questions for provider review:    None                                                                                                                                    Jailyn Strickland MA      Chart routed to  .

## 2019-08-14 ENCOUNTER — ALLIED HEALTH/NURSE VISIT (OUTPATIENT)
Dept: NURSING | Facility: CLINIC | Age: 49
End: 2019-08-14
Payer: COMMERCIAL

## 2019-08-14 DIAGNOSIS — Z23 NEED FOR TUBERCULOSIS VACCINATION: Primary | ICD-10-CM

## 2019-08-14 PROCEDURE — 86580 TB INTRADERMAL TEST: CPT

## 2019-08-14 PROCEDURE — 99207 ZZC NO CHARGE NURSE ONLY: CPT

## 2019-08-14 NOTE — NURSING NOTE

## 2019-08-16 ENCOUNTER — ALLIED HEALTH/NURSE VISIT (OUTPATIENT)
Dept: NURSING | Facility: CLINIC | Age: 49
End: 2019-08-16
Payer: COMMERCIAL

## 2019-08-16 DIAGNOSIS — Z11.1 SCREENING EXAMINATION FOR PULMONARY TUBERCULOSIS: Primary | ICD-10-CM

## 2019-08-16 LAB
PPDINDURATION: 0 MM (ref 0–5)
PPDREDNESS: 0 MM

## 2019-08-16 NOTE — NURSING NOTE
TB INTRADERMAL TEST (Order #[id number]) on 8/14/19   Component Value Flag Ref Range Units Status Resulted Lab   PPD Induration 0   0 - 5 mm Final 08/16/2019  1:31 PM Unknown   PPD Redness 0    mm Final 08/16/2019  1:31 PM Unknown   Narrative     Mantoux results: Negative  No induration.  No swelling.  No redness.    Annalise Gastelum RN

## 2023-01-26 ENCOUNTER — TRANSFERRED RECORDS (OUTPATIENT)
Dept: HEALTH INFORMATION MANAGEMENT | Facility: CLINIC | Age: 53
End: 2023-01-26

## 2023-01-27 ENCOUNTER — ANCILLARY ORDERS (OUTPATIENT)
Dept: MAMMOGRAPHY | Facility: CLINIC | Age: 53
End: 2023-01-27

## 2023-01-27 DIAGNOSIS — Z71.89 GOALS OF CARE, COUNSELING/DISCUSSION: ICD-10-CM

## 2023-05-26 ENCOUNTER — TRANSFERRED RECORDS (OUTPATIENT)
Dept: HEALTH INFORMATION MANAGEMENT | Facility: CLINIC | Age: 53
End: 2023-05-26
Payer: COMMERCIAL

## 2023-05-26 ENCOUNTER — MEDICAL CORRESPONDENCE (OUTPATIENT)
Dept: HEALTH INFORMATION MANAGEMENT | Facility: CLINIC | Age: 53
End: 2023-05-26
Payer: COMMERCIAL

## 2023-06-01 ENCOUNTER — ANCILLARY PROCEDURE (OUTPATIENT)
Dept: MAMMOGRAPHY | Facility: CLINIC | Age: 53
End: 2023-06-01
Attending: FAMILY MEDICINE
Payer: COMMERCIAL

## 2023-06-01 DIAGNOSIS — Z71.89 GOALS OF CARE, COUNSELING/DISCUSSION: ICD-10-CM

## 2023-06-01 PROCEDURE — 77067 SCR MAMMO BI INCL CAD: CPT | Mod: TC | Performed by: RADIOLOGY

## 2023-10-09 ENCOUNTER — OFFICE VISIT (OUTPATIENT)
Dept: FAMILY MEDICINE | Facility: CLINIC | Age: 53
End: 2023-10-09
Payer: COMMERCIAL

## 2023-10-09 VITALS
RESPIRATION RATE: 15 BRPM | TEMPERATURE: 97.4 F | SYSTOLIC BLOOD PRESSURE: 99 MMHG | HEIGHT: 57 IN | OXYGEN SATURATION: 98 % | WEIGHT: 111.8 LBS | BODY MASS INDEX: 24.12 KG/M2 | DIASTOLIC BLOOD PRESSURE: 62 MMHG | HEART RATE: 63 BPM

## 2023-10-09 DIAGNOSIS — F33.1 MODERATE RECURRENT MAJOR DEPRESSION (H): ICD-10-CM

## 2023-10-09 DIAGNOSIS — Z00.00 ROUTINE GENERAL MEDICAL EXAMINATION AT A HEALTH CARE FACILITY: Primary | ICD-10-CM

## 2023-10-09 DIAGNOSIS — Z12.11 SCREEN FOR COLON CANCER: ICD-10-CM

## 2023-10-09 DIAGNOSIS — Z11.4 SCREENING FOR HIV (HUMAN IMMUNODEFICIENCY VIRUS): ICD-10-CM

## 2023-10-09 DIAGNOSIS — Z12.4 CERVICAL CANCER SCREENING: ICD-10-CM

## 2023-10-09 PROCEDURE — 99386 PREV VISIT NEW AGE 40-64: CPT | Mod: 25 | Performed by: FAMILY MEDICINE

## 2023-10-09 PROCEDURE — 87624 HPV HI-RISK TYP POOLED RSLT: CPT | Performed by: FAMILY MEDICINE

## 2023-10-09 PROCEDURE — G0145 SCR C/V CYTO,THINLAYER,RESCR: HCPCS | Performed by: FAMILY MEDICINE

## 2023-10-09 PROCEDURE — 90471 IMMUNIZATION ADMIN: CPT | Performed by: FAMILY MEDICINE

## 2023-10-09 PROCEDURE — 96127 BRIEF EMOTIONAL/BEHAV ASSMT: CPT | Performed by: FAMILY MEDICINE

## 2023-10-09 PROCEDURE — 90480 ADMN SARSCOV2 VAC 1/ONLY CMP: CPT | Performed by: FAMILY MEDICINE

## 2023-10-09 PROCEDURE — 90682 RIV4 VACC RECOMBINANT DNA IM: CPT | Performed by: FAMILY MEDICINE

## 2023-10-09 PROCEDURE — 91320 SARSCV2 VAC 30MCG TRS-SUC IM: CPT | Performed by: FAMILY MEDICINE

## 2023-10-09 ASSESSMENT — ENCOUNTER SYMPTOMS
DIZZINESS: 0
BREAST MASS: 0
HEMATURIA: 0
PARESTHESIAS: 0
MYALGIAS: 0
NERVOUS/ANXIOUS: 0
HEMATOCHEZIA: 0
EYE PAIN: 0
HEADACHES: 0
JOINT SWELLING: 0
NAUSEA: 0
FEVER: 0
PALPITATIONS: 0
FREQUENCY: 0
COUGH: 0
HEARTBURN: 0
DIARRHEA: 0
WEAKNESS: 0
SORE THROAT: 0
CHILLS: 0
DYSURIA: 0
ARTHRALGIAS: 0
ABDOMINAL PAIN: 0
CONSTIPATION: 0
SHORTNESS OF BREATH: 0

## 2023-10-09 ASSESSMENT — PATIENT HEALTH QUESTIONNAIRE - PHQ9
SUM OF ALL RESPONSES TO PHQ QUESTIONS 1-9: 16
10. IF YOU CHECKED OFF ANY PROBLEMS, HOW DIFFICULT HAVE THESE PROBLEMS MADE IT FOR YOU TO DO YOUR WORK, TAKE CARE OF THINGS AT HOME, OR GET ALONG WITH OTHER PEOPLE: EXTREMELY DIFFICULT
SUM OF ALL RESPONSES TO PHQ QUESTIONS 1-9: 16

## 2023-10-09 ASSESSMENT — PAIN SCALES - GENERAL: PAINLEVEL: NO PAIN (0)

## 2023-10-09 NOTE — COMMUNITY RESOURCES LIST (ENGLISH)
10/09/2023   [facility] Organizer  N/A  For questions about this resource list or additional care needs, please contact your primary care clinic or care manager.  Phone: [phone]   Email: N/A   Address: [address] [place] [address]   Hours: N/A        Financial Stability       Rent and mortgage payment assistance  1  [employer] ([employer]) - Home Save Program Distance: 1.62 miles      Phone/Virtual   [address] [place] [address]  Language: English, Dominican  Hours: Mon - Fri 9:00 AM - 5:00 PM  Fees: Free   Phone: (176) 909-5105 Email: services@[employer] Website: https://www.[employer]     2  [facility] - Emergency Assistance Program (EAP) - Rent Assistance Distance: 2.77 miles      In-Person, Phone/Virtual   [address] [place] [address]  Language: American Sign Language, English  Hours: Mon - Fri 8:00 AM - 4:00 PM  Fees: Free   Phone: (793) 570-9533 Email: servicecenterinfo@[facility] Website: https://www.[facility]/residents#human-services          Food and Nutrition       Food pantry  3  [facility] - Food Distribution Distance: 1.88 miles      Pickup   [address] [place] [address]  Language: English  Hours: Fri 5:00 PM - 7:00 PM  Fees: Free   Phone: (786) 761-6295 Email: office@[employer] Website: http://www.[employer].org     4  [facility] Distance: 2.05 miles      In-Person   [address] [place] [address]  Language: English  Hours: Tue 10:00 AM - 11:30 AM , Wed 5:00 PM - 6:30 PM , Fri 10:00 AM - 11:30 AM  Fees: Free   Phone: (606) 813-3043 Email: info@[facility].org Website: http://www.[facility].org/     SNAP application assistance  5  [employer] -   Family Wellness (AIFW) Distance: 1.17 miles      In-Person, Phone/Virtual   [address] [place] [address]  Language: English, Kiswahili, English,  Gujarati, Linda, Turkish, Persian, Greek, Korean, Japanese  Hours: Mon - Wed 9:00 AM - 5:00 PM , Thu 12:00 PM - 6:00 PM , Fri 9:00 AM - 5:00 PM , Sun 10:30 AM - 2:00 PM Appt. Only  Fees: Free   Phone: (287) 265-8146 Email: info@[employer] Website: https://www.[employer].org/     6  [facility] [employer] [facility] ([facility]) Distance: 2.51 miles      In-Person, Phone/Virtual   [address] [id number]  Language: English, Hmong  Hours: Mon - Fri 8:30 AM - 4:30 PM  Fees: Free   Phone: (134) 939-6052 Ext.1 Email: info@[employer] Website: https://www.[employer]/     Soup kitchen or free meals  7  [facility] - Family Table Meal Distance: 1.52 miles      [address] [id number]  Language: English  Hours: Sat 11:30 AM - 12:30 PM  Fees: Free   Phone: (860) 572-1183 Email: aline@[facility].[employer] Website: http://www.[facility].[employer]/     8  [facility] [facility] Community Supper Distance: 2.05 miles      In-Person   [address] [place] [address]  Language: English  Hours: Wed 5:15 PM - 6:00 PM  Fees: Free   Phone: (248) 898-6892 Email: info@[employer].org Website: http://www.[facility].org/          Important Numbers & Websites       Emergency Services   911  City Services   311  Poison Control   (675) 716-6968  Suicide Prevention Lifeline   (461) 237-5145 (TALK)  Child Abuse Hotline   (517) 837-6188 (4-A-Child)  Sexual Assault Hotline   (570) 936-9081 (HOPE)  National Runaway Safeline   (698) 625-9721 (RUNAWAY)  All-Options Talkline   (805) 223-6403  Substance Abuse Referral   (106) 885-2355 (HELP)

## 2023-10-09 NOTE — PROGRESS NOTES
SUBJECTIVE:   CC: Pa is an 53 year old who presents for preventive health visit.       10/9/2023     8:34 AM   Additional Questions   Roomed by Marsha   Accompanied by self       Healthy Habits:     Getting at least 3 servings of Calcium per day:  Yes    Bi-annual eye exam:  NO    Dental care twice a year:  NO    Sleep apnea or symptoms of sleep apnea:  None    Diet:  Regular (no restrictions)    Frequency of exercise:  4-5 days/week    Duration of exercise:  45-60 minutes    Taking medications regularly:  Yes    Medication side effects:  None    Additional concerns today:  No    Going to therapy every week (Monday mornings)  Had cologuard this yr in July 2023- pt reported    [facility] Dr.Yee cm done this yr normal    Today's PHQ-9 Score:       10/9/2023     8:57 AM   PHQ-9 SCORE   PHQ-9 Total Score MyChart 16 (Moderately severe depression)   PHQ-9 Total Score 16         Denied thoughts of hurting herself.          Have you ever done Advance Care Planning? (For example, a Health Directive, POLST, or a discussion with a medical provider or your loved ones about your wishes): No, advance care planning information given to patient to review.  Patient plans to discuss their wishes with loved ones or provider.      Social History     Tobacco Use    Smoking status: Never    Smokeless tobacco: Never   Substance Use Topics    Alcohol use: No             10/9/2023     8:51 AM   Alcohol Use   Prescreen: >3 drinks/day or >7 drinks/week? No     Reviewed orders with patient.  Reviewed health maintenance and updated orders accordingly - Yes  Labs done outside    Breast Cancer Screening:    FHS-7:       6/1/2023     1:17 PM   Breast CA Risk Assessment (FHS-7)   Did any of your first-degree relatives have breast or ovarian cancer? No   Did any of your relatives have bilateral breast cancer? No   Did any man in your family have breast cancer? No   Did any woman in your family have breast and ovarian cancer? No  "  Did any woman in your family have breast cancer before age 50 y? No   Do you have 2 or more relatives with breast and/or bowel cancer? No       Mammogram Screening: Recommended annual mammography  Pertinent mammograms are reviewed under the imaging tab.    History of abnormal Pap smear: NO - age 30-65 PAP every 5 years with negative HPV co-testing recommended     Reviewed and updated as needed this visit by clinical staff   Tobacco  Allergies  Meds              Reviewed and updated as needed this visit by Provider                     Review of Systems   Constitutional:  Negative for chills and fever.   HENT:  Negative for congestion, ear pain, hearing loss and sore throat.    Eyes:  Positive for visual disturbance. Negative for pain.   Respiratory:  Negative for cough and shortness of breath.    Cardiovascular:  Negative for chest pain, palpitations and peripheral edema.   Gastrointestinal:  Negative for abdominal pain, constipation, diarrhea, heartburn, hematochezia and nausea.   Breasts:  Negative for tenderness, breast mass and discharge.   Genitourinary:  Negative for dysuria, frequency, genital sores, hematuria, pelvic pain, urgency, vaginal bleeding and vaginal discharge.   Musculoskeletal:  Negative for arthralgias, joint swelling and myalgias.   Skin:  Negative for rash.   Neurological:  Negative for dizziness, weakness, headaches and paresthesias.   Psychiatric/Behavioral:  Positive for mood changes. The patient is not nervous/anxious.           OBJECTIVE:   BP 99/62 (BP Location: Left arm, Patient Position: Sitting, Cuff Size: Adult Regular)   Pulse 63   Temp 97.4  F (36.3  C) (Oral)   Resp 15   Ht 1.46 m (4' 9.48\")   Wt 50.7 kg (111 lb 12.8 oz)   SpO2 98%   BMI 23.79 kg/m    Physical Exam  GENERAL: healthy, alert and no distress  NECK: no adenopathy, no asymmetry, masses, or scars and thyroid normal to palpation  RESP: lungs clear to auscultation - no rales, rhonchi or wheezes  CV: regular " rate and rhythm, normal S1 S2, no S3 or S4, no murmur, click or rub, no peripheral edema and peripheral pulses strong  ABDOMEN: soft, nontender, no hepatosplenomegaly, no masses and bowel sounds normal  MS: no gross musculoskeletal defects noted, no edema    Diagnostic Test Results:  Labs reviewed in Epic    ASSESSMENT/PLAN:   (Z00.00) Routine general medical examination at a health care facility  (primary encounter diagnosis)  -Vitals stable, depression screening abnormal as addressed below.  -She had routine labs done at [facility]  this year in June 2023.  Patient reported that lab results are normal.  Hence deferring labs this year.    Plan: Adult Eye  Referral         (F33.1) Moderate recurrent major depression (H)  -On therapy every Monday.  Denies thoughts of hurting herself.    (Z12.11) Screen for colon cancer  -Denied colonoscopy.  Patient reported that she had Cologuard done in June 2023 and result was normal.      (Z12.4) Cervical cancer screening  -Has not had Pap in more than 20 years.  Pap done today.  Plan: Pap Screen with HPV - recommended age 30 - 65         years       Patient has been advised of split billing requirements and indicates understanding: Yes    Depression Screening Follow Up        10/9/2023     8:57 AM   PHQ   PHQ-9 Total Score 16   Q9: Thoughts of better off dead/self-harm past 2 weeks Several days   F/U: Thoughts of suicide or self-harm No   F/U: Safety concerns No         10/9/2023     8:57 AM   Last PHQ-9   1.  Little interest or pleasure in doing things 3   2.  Feeling down, depressed, or hopeless 3   3.  Trouble falling or staying asleep, or sleeping too much 3   4.  Feeling tired or having little energy 0   5.  Poor appetite or overeating 3   6.  Feeling bad about yourself 3   7.  Trouble concentrating 0   8.  Moving slowly or restless 0   Q9: Thoughts of better off dead/self-harm past 2 weeks 1   PHQ-9 Total Score 16   In the past two weeks have  you had thoughts of suicide or self harm? No   Do you have concerns about your personal safety or the safety of others? No               Follow Up    Follow Up Actions Taken  Crisis resource information provided in the After Visit Summary  Patient declined referral.  Already undergoing mental health therapy.    Discussed the following ways the patient can remain in a safe environment:      COUNSELING:  Reviewed preventive health counseling, as reflected in patient instructions       Regular exercise       Healthy diet/nutrition        She reports that she has never smoked. She has never used smokeless tobacco.          Kourtney Contreras MD  [facility].undefined[[id number]^^Answers submitted by the patient for this visit:  Patient Health Questionnaire (Submitted on 10/9/2023)  If you checked off any problems, how difficult have these problems made it for you to do your work, take care of things at home, or get along with other people?: Extremely difficult  PHQ9 TOTAL SCORE: 16

## 2023-10-12 LAB
BKR LAB AP GYN ADEQUACY: NORMAL
BKR LAB AP GYN INTERPRETATION: NORMAL
BKR LAB AP HPV REFLEX: NORMAL
BKR LAB AP PREVIOUS ABNORMAL: NORMAL
PATH REPORT.COMMENTS IMP SPEC: NORMAL
PATH REPORT.COMMENTS IMP SPEC: NORMAL
PATH REPORT.RELEVANT HX SPEC: NORMAL

## 2023-10-16 LAB
HUMAN PAPILLOMA VIRUS 16 DNA: NEGATIVE
HUMAN PAPILLOMA VIRUS 18 DNA: NEGATIVE
HUMAN PAPILLOMA VIRUS FINAL DIAGNOSIS: NORMAL
HUMAN PAPILLOMA VIRUS OTHER HR: NEGATIVE

## 2023-10-24 ENCOUNTER — APPOINTMENT (OUTPATIENT)
Dept: OPTOMETRY | Facility: CLINIC | Age: 53
End: 2023-10-24
Payer: COMMERCIAL

## 2023-10-24 ENCOUNTER — OFFICE VISIT (OUTPATIENT)
Dept: OPTOMETRY | Facility: CLINIC | Age: 53
End: 2023-10-24
Attending: FAMILY MEDICINE
Payer: COMMERCIAL

## 2023-10-24 DIAGNOSIS — Z00.00 ROUTINE GENERAL MEDICAL EXAMINATION AT A HEALTH CARE FACILITY: ICD-10-CM

## 2023-10-24 DIAGNOSIS — H11.001 PTERYGIUM OF RIGHT EYE: Primary | ICD-10-CM

## 2023-10-24 DIAGNOSIS — H25.13 AGE-RELATED NUCLEAR CATARACT OF BOTH EYES: ICD-10-CM

## 2023-10-24 DIAGNOSIS — H35.362 DRUSEN OF LEFT MACULA: ICD-10-CM

## 2023-10-24 PROCEDURE — 92004 COMPRE OPH EXAM NEW PT 1/>: CPT | Performed by: OPTOMETRIST

## 2023-10-24 PROCEDURE — V2200 LENS SPHER BIFOC PLANO 4.00D: HCPCS | Mod: RT | Performed by: OPTOMETRIST

## 2023-10-24 PROCEDURE — V2020 VISION SVCS FRAMES PURCHASES: HCPCS | Performed by: OPTOMETRIST

## 2023-10-24 PROCEDURE — 92015 DETERMINE REFRACTIVE STATE: CPT | Performed by: OPTOMETRIST

## 2023-10-24 ASSESSMENT — REFRACTION_MANIFEST
OS_CYLINDER: +0.75
OS_CYLINDER: +0.50
OD_SPHERE: +0.50
OD_CYLINDER: +1.00
OS_SPHERE: +0.50
OD_AXIS: 159
OS_AXIS: 020
OD_AXIS: 160
OS_AXIS: 020
OD_ADD: +2.50
OD_CYLINDER: +0.75
OD_SPHERE: +0.25
OS_SPHERE: +0.50
METHOD_AUTOREFRACTION: 1
OS_ADD: +2.50

## 2023-10-24 ASSESSMENT — VISUAL ACUITY
OD_SC: 20/40
METHOD: SNELLEN - LINEAR
OS_SC: 20/400
OD_SC: 20/200
OS_SC: 20/40
OS_SC+: -2

## 2023-10-24 ASSESSMENT — CUP TO DISC RATIO
OS_RATIO: 0.5
OD_RATIO: 0.5

## 2023-10-24 ASSESSMENT — KERATOMETRY
OS_K2POWER_DIOPTERS: 44.50
OD_K2POWER_DIOPTERS: 44.50
OS_AXISANGLE2_DEGREES: 100
OS_K1POWER_DIOPTERS: 44.25
OS_AXISANGLE_DEGREES: 10
OD_K1POWER_DIOPTERS: 43.75
OD_AXISANGLE_DEGREES: 171
OD_AXISANGLE2_DEGREES: 81

## 2023-10-24 ASSESSMENT — SLIT LAMP EXAM - LIDS
COMMENTS: NORMAL
COMMENTS: NORMAL

## 2023-10-24 ASSESSMENT — CONF VISUAL FIELD
OS_SUPERIOR_NASAL_RESTRICTION: 0
OS_NORMAL: 1
OD_INFERIOR_NASAL_RESTRICTION: 0
OD_INFERIOR_TEMPORAL_RESTRICTION: 0
OD_NORMAL: 1
OD_SUPERIOR_NASAL_RESTRICTION: 0
OS_INFERIOR_NASAL_RESTRICTION: 0
OD_SUPERIOR_TEMPORAL_RESTRICTION: 0
OS_INFERIOR_TEMPORAL_RESTRICTION: 0
OS_SUPERIOR_TEMPORAL_RESTRICTION: 0

## 2023-10-24 ASSESSMENT — EXTERNAL EXAM - RIGHT EYE: OD_EXAM: NORMAL

## 2023-10-24 ASSESSMENT — TONOMETRY
OS_IOP_MMHG: 15
IOP_METHOD: APPLANATION
OD_IOP_MMHG: 16

## 2023-10-24 ASSESSMENT — EXTERNAL EXAM - LEFT EYE: OS_EXAM: NORMAL

## 2023-10-24 NOTE — PROGRESS NOTES
Chief Complaint   Patient presents with    Annual Eye Exam      Accompanied by   Last Eye Exam: 2013  Dilated Previously: Yes    What are you currently using to see?  does not use glasses or contacts       Distance Vision Acuity: Satisfied with vision    Near Vision Acuity: Not satisfied     Eye Comfort: irritated feeling when trying to see up close   Do you use eye drops? : No      Denelle Neha - Optometric Assistant          Medical, surgical and family histories reviewed and updated 10/24/2023.       OBJECTIVE: See Ophthalmology exam    ASSESSMENT:    ICD-10-CM    1. Routine general medical examination at a health care facility  Z00.00 Adult Eye  Referral          PLAN:     There are no Patient Instructions on file for this visit.

## 2023-10-24 NOTE — LETTER
10/24/2023         RE: Abbe Hines  [address] [place] [address]        Dear Colleague,    Thank you for referring your patient, Abbe Hines, to the [facility]. Please see a copy of my visit note below.    Chief Complaint   Patient presents with     Annual Eye Exam      Accompanied by   Last Eye Exam: 2013  Dilated Previously: Yes    What are you currently using to see?  does not use glasses or contacts       Distance Vision Acuity: Satisfied with vision    Near Vision Acuity: Not satisfied     Eye Comfort: irritated feeling when trying to see up close   Do you use eye drops? : No      Denelle Neha - Optometric Assistant          Medical, surgical and family histories reviewed and updated 10/24/2023.       OBJECTIVE: See Ophthalmology exam    ASSESSMENT:    ICD-10-CM    1. Routine general medical examination at a health care facility  Z00.00 Adult Eye  Referral          PLAN:     There are no Patient Instructions on file for this visit.       Again, thank you for allowing me to participate in the care of your patient.        Sincerely,        Gale Newberry OD

## 2023-10-24 NOTE — PATIENT INSTRUCTIONS
You have the start of mild cataracts.  You may notice some blurred vision or glare with night driving.  It is important that you wear good sunglasses to protect your eyes from the ultraviolet light from the sun.  I recommend that you return in 1 year for an eye exam unless there are any sudden changes in your vision.       Astigmatism results from curvature differential in the cornea and crystalline lens which can cause a distorted image, as light rays are prevented from meeting at a common focus.    Presbyopia is the diagnosis. Presbyopia is an age-related condition where the eye's crystalline lens doesn't change shape as easily as it once did.    SEE DR SCHUYLER ESCOBAR AT [place] FOR ALL EYE VISITS    You have the start of macular drusen.  The [employer] Grid was given to you today to test with your glasses on ONE EYE AT A TIME. IF THERE IS A CHANGE SEE DR ESCOBAR so a baseline OCT test can be done.    Take PRESERVISION vitamins daily.      The affects of the dilating drops last for 4- 6 hours.  You will be more sensitive to light and vision will be blurry up close.  Do not drive if you do not feel comfortable.  Mydriatic sunglasses were given if needed.    Recommend annual eye exams.    Gale Newberry O.D.  [address] [address] [phone]

## 2023-11-03 ENCOUNTER — APPOINTMENT (OUTPATIENT)
Dept: OPTOMETRY | Facility: CLINIC | Age: 53
End: 2023-11-03
Payer: COMMERCIAL

## 2023-11-03 PROCEDURE — 92341 FIT SPECTACLES BIFOCAL: CPT | Performed by: OPTOMETRIST
